# Patient Record
Sex: MALE | Employment: FULL TIME | URBAN - METROPOLITAN AREA
[De-identification: names, ages, dates, MRNs, and addresses within clinical notes are randomized per-mention and may not be internally consistent; named-entity substitution may affect disease eponyms.]

---

## 2023-08-05 ENCOUNTER — HOSPITAL ENCOUNTER (EMERGENCY)
Age: 58
Discharge: HOME | End: 2023-08-05
Attending: EMERGENCY MEDICINE
Payer: COMMERCIAL

## 2023-08-05 ENCOUNTER — APPOINTMENT (OUTPATIENT)
Dept: GENERAL RADIOLOGY | Age: 58
End: 2023-08-05
Payer: COMMERCIAL

## 2023-08-05 VITALS
TEMPERATURE: 97.9 F | HEART RATE: 91 BPM | BODY MASS INDEX: 26.11 KG/M2 | SYSTOLIC BLOOD PRESSURE: 157 MMHG | HEIGHT: 73 IN | DIASTOLIC BLOOD PRESSURE: 91 MMHG | WEIGHT: 197 LBS | RESPIRATION RATE: 17 BRPM | OXYGEN SATURATION: 98 %

## 2023-08-05 DIAGNOSIS — K56.41 FECAL IMPACTION IN RECTUM (HCC): Primary | ICD-10-CM

## 2023-08-05 DIAGNOSIS — R33.9 URINARY RETENTION: ICD-10-CM

## 2023-08-05 LAB
BILIRUB UR QL STRIP: NEGATIVE
CLARITY UR: CLEAR
COLOR UR: YELLOW
GLUCOSE UR STRIP-MCNC: >=1000 MG/DL
HGB UR QL STRIP: NEGATIVE
KETONES UR STRIP-MCNC: 15 MG/DL
LEUKOCYTE ESTERASE UR QL STRIP: NEGATIVE
NITRITE UR QL STRIP: NEGATIVE
PH UR STRIP: 5 [PH] (ref 5–9)
PROT UR STRIP-MCNC: NEGATIVE MG/DL
SP GR UR STRIP: 1.02 (ref 1–1.03)
URINE REFLEX TO CULTURE: ABNORMAL
UROBILINOGEN UR STRIP-ACNC: 0.2 E.U./DL

## 2023-08-05 PROCEDURE — 99284 EMERGENCY DEPT VISIT MOD MDM: CPT

## 2023-08-05 PROCEDURE — 74018 RADEX ABDOMEN 1 VIEW: CPT

## 2023-08-05 PROCEDURE — 81003 URINALYSIS AUTO W/O SCOPE: CPT

## 2023-08-05 ASSESSMENT — PAIN DESCRIPTION - LOCATION: LOCATION: RECTUM

## 2023-08-05 ASSESSMENT — PAIN DESCRIPTION - FREQUENCY: FREQUENCY: CONTINUOUS

## 2023-08-05 ASSESSMENT — PAIN SCALES - GENERAL: PAINLEVEL_OUTOF10: 4

## 2023-08-05 ASSESSMENT — PAIN DESCRIPTION - DESCRIPTORS: DESCRIPTORS: ACHING

## 2023-08-05 ASSESSMENT — PAIN - FUNCTIONAL ASSESSMENT
PAIN_FUNCTIONAL_ASSESSMENT: PREVENTS OR INTERFERES SOME ACTIVE ACTIVITIES AND ADLS
PAIN_FUNCTIONAL_ASSESSMENT: 0-10

## 2023-08-05 NOTE — ED NOTES
Multiple attempts with soap suds  enema  made  approx total of 600 mls  used   pt still unable to have a bowel movemnt   pt states he has to  urinate  but cant go   straight cathed  patient  approx 500mls urine  obtained     dr Amee Hawkins  notified      Adria Lindsey, RN  08/05/23 [id number]

## 2023-08-05 NOTE — ED NOTES
Pt had large bowel movement states he feels  almost instant releif   dr Rolan Joseph notified      Tessa Artis, LORENA  08/05/23 1132

## 2023-08-05 NOTE — ED TRIAGE NOTES
Pt arrives to ED, from home, via personal vehicle. Pt presents with what he believes is impaction. Pt states he is unable to expel what is at his rectum. Pt's last BM was yesterday.

## 2023-08-05 NOTE — ED PROVIDER NOTES
[facility] ED  EMERGENCY DEPARTMENT ENCOUNTER      Pt Name: Luke Vega  MRN: [id number]  [address] [facility] [place] 1965  Date of evaluation: 8/5/2023  Provider: Zeke Jimenez DO    CHIEF COMPLAINT       Chief Complaint   Patient presents with    Constipation     Times one day-pt feels impacted     Complaint: Constipation  History of chief complaint: This 27-year-old gentleman presents the emergency department complaining of constipation and unable to urinate. States his bowels usually move every morning. Patient states he did not go this morning states this afternoon felt like he had to move his bowels states he went to the toilet was pushing and trying states he can feel the stool at the rectum with increased pressure but unable to go. States he was sitting on the toilet for almost 45 minutes without success. Patient states he called his doctor who advised him to increase fluids try some Dulcolax patient states he drank about 32 ounces of water. Patient states he then realized his bladder was feeling full and unable to urinate. Patient reports increased bladder pain suprapubic and pressure feels like he has to go but cannot. Denies any associated back pain no fevers chills nausea vomiting weak or dizzy. Patient denies other complaint    Nursing Notes were reviewed. REVIEW OF SYSTEMS    (2-9 systems for level 4, 10 or more for level 5)     Review of Systems  Pertinent findings documented in the history of present illness  Except as noted above the remainder of the review of systems was reviewed and negative. PAST MEDICAL HISTORY   No past medical history on file. SURGICAL HISTORY     No past surgical history on file. CURRENT MEDICATIONS       Previous Medications    No medications on file       ALLERGIES     Patient has no known allergies. FAMILY HISTORY     No family history on file.        SOCIAL HISTORY       Social History     Socioeconomic History    Marital status: